# Patient Record
Sex: MALE | Race: WHITE | NOT HISPANIC OR LATINO | URBAN - METROPOLITAN AREA
[De-identification: names, ages, dates, MRNs, and addresses within clinical notes are randomized per-mention and may not be internally consistent; named-entity substitution may affect disease eponyms.]

---

## 2017-01-03 ENCOUNTER — COMMUNICATION - HEALTHEAST (OUTPATIENT)
Dept: CARDIOLOGY | Facility: CLINIC | Age: 72
End: 2017-01-03

## 2017-01-12 ENCOUNTER — AMBULATORY - HEALTHEAST (OUTPATIENT)
Dept: CARDIOLOGY | Facility: CLINIC | Age: 72
End: 2017-01-12

## 2017-01-12 DIAGNOSIS — I48.19 PERSISTENT ATRIAL FIBRILLATION (H): ICD-10-CM

## 2017-01-16 ENCOUNTER — COMMUNICATION - HEALTHEAST (OUTPATIENT)
Dept: CARDIOLOGY | Facility: CLINIC | Age: 72
End: 2017-01-16

## 2017-01-16 DIAGNOSIS — I48.20 CHRONIC ATRIAL FIBRILLATION (H): ICD-10-CM

## 2017-01-17 ENCOUNTER — AMBULATORY - HEALTHEAST (OUTPATIENT)
Dept: CARDIOLOGY | Facility: CLINIC | Age: 72
End: 2017-01-17

## 2017-01-17 ENCOUNTER — OFFICE VISIT - HEALTHEAST (OUTPATIENT)
Dept: CARDIOLOGY | Facility: CLINIC | Age: 72
End: 2017-01-17

## 2017-01-17 DIAGNOSIS — I48.19 PERSISTENT ATRIAL FIBRILLATION (H): ICD-10-CM

## 2017-01-17 DIAGNOSIS — R06.09 DYSPNEA ON EXERTION: ICD-10-CM

## 2017-01-17 DIAGNOSIS — I48.20 CHRONIC ATRIAL FIBRILLATION (H): ICD-10-CM

## 2017-01-17 DIAGNOSIS — I25.10 CORONARY ATHEROSCLEROSIS: ICD-10-CM

## 2017-01-17 ASSESSMENT — MIFFLIN-ST. JEOR: SCORE: 1883.85

## 2017-01-18 LAB
ATRIAL RATE - MUSE: 227 BPM
DIASTOLIC BLOOD PRESSURE - MUSE: NORMAL MMHG
INTERPRETATION ECG - MUSE: NORMAL
P AXIS - MUSE: NORMAL DEGREES
PR INTERVAL - MUSE: NORMAL MS
QRS DURATION - MUSE: 88 MS
QT - MUSE: 424 MS
QTC - MUSE: 426 MS
R AXIS - MUSE: -10 DEGREES
SYSTOLIC BLOOD PRESSURE - MUSE: NORMAL MMHG
T AXIS - MUSE: -6 DEGREES
VENTRICULAR RATE- MUSE: 61 BPM

## 2017-01-19 ENCOUNTER — HOSPITAL ENCOUNTER (OUTPATIENT)
Dept: CARDIOLOGY | Facility: HOSPITAL | Age: 72
Discharge: HOME | End: 2017-01-19
Attending: NURSE PRACTITIONER

## 2017-01-19 DIAGNOSIS — I48.20 CHRONIC ATRIAL FIBRILLATION (H): ICD-10-CM

## 2017-01-27 ENCOUNTER — AMBULATORY - HEALTHEAST (OUTPATIENT)
Dept: CARDIOLOGY | Facility: CLINIC | Age: 72
End: 2017-01-27

## 2017-03-17 ENCOUNTER — COMMUNICATION - HEALTHEAST (OUTPATIENT)
Dept: CARDIOLOGY | Facility: CLINIC | Age: 72
End: 2017-03-17

## 2017-03-17 DIAGNOSIS — I48.20 CHRONIC ATRIAL FIBRILLATION (H): ICD-10-CM

## 2017-03-29 ENCOUNTER — COMMUNICATION - HEALTHEAST (OUTPATIENT)
Dept: CARDIOLOGY | Facility: CLINIC | Age: 72
End: 2017-03-29

## 2017-03-29 DIAGNOSIS — I48.20 CHRONIC ATRIAL FIBRILLATION (H): ICD-10-CM

## 2017-04-04 ENCOUNTER — AMBULATORY - HEALTHEAST (OUTPATIENT)
Dept: CARDIOLOGY | Facility: CLINIC | Age: 72
End: 2017-04-04

## 2017-04-04 DIAGNOSIS — I48.20 CHRONIC ATRIAL FIBRILLATION (H): ICD-10-CM

## 2017-04-11 ENCOUNTER — AMBULATORY - HEALTHEAST (OUTPATIENT)
Dept: CARDIOLOGY | Facility: CLINIC | Age: 72
End: 2017-04-11

## 2017-04-11 DIAGNOSIS — I48.19 PERSISTENT ATRIAL FIBRILLATION (H): ICD-10-CM

## 2017-04-17 ENCOUNTER — AMBULATORY - HEALTHEAST (OUTPATIENT)
Dept: CARDIOLOGY | Facility: CLINIC | Age: 72
End: 2017-04-17

## 2017-04-17 DIAGNOSIS — I48.19 PERSISTENT ATRIAL FIBRILLATION (H): ICD-10-CM

## 2017-04-24 ENCOUNTER — COMMUNICATION - HEALTHEAST (OUTPATIENT)
Dept: LAB | Facility: CLINIC | Age: 72
End: 2017-04-24

## 2017-04-26 ENCOUNTER — AMBULATORY - HEALTHEAST (OUTPATIENT)
Dept: CARDIOLOGY | Facility: CLINIC | Age: 72
End: 2017-04-26

## 2017-04-26 DIAGNOSIS — I48.19 PERSISTENT ATRIAL FIBRILLATION (H): ICD-10-CM

## 2017-05-05 ENCOUNTER — COMMUNICATION - HEALTHEAST (OUTPATIENT)
Dept: CARDIOLOGY | Facility: CLINIC | Age: 72
End: 2017-05-05

## 2017-05-05 DIAGNOSIS — I48.20 CHRONIC ATRIAL FIBRILLATION (H): ICD-10-CM

## 2017-05-31 ENCOUNTER — COMMUNICATION - HEALTHEAST (OUTPATIENT)
Dept: CARDIOLOGY | Facility: CLINIC | Age: 72
End: 2017-05-31

## 2017-05-31 DIAGNOSIS — I48.20 CHRONIC ATRIAL FIBRILLATION (H): ICD-10-CM

## 2017-06-14 ENCOUNTER — COMMUNICATION - HEALTHEAST (OUTPATIENT)
Dept: CARDIOLOGY | Facility: CLINIC | Age: 72
End: 2017-06-14

## 2017-06-14 DIAGNOSIS — I48.20 CHRONIC ATRIAL FIBRILLATION (H): ICD-10-CM

## 2017-07-07 ENCOUNTER — COMMUNICATION - HEALTHEAST (OUTPATIENT)
Dept: CARDIOLOGY | Facility: CLINIC | Age: 72
End: 2017-07-07

## 2017-07-07 DIAGNOSIS — I48.20 CHRONIC ATRIAL FIBRILLATION (H): ICD-10-CM

## 2017-07-18 ENCOUNTER — AMBULATORY - HEALTHEAST (OUTPATIENT)
Dept: CARDIOLOGY | Facility: CLINIC | Age: 72
End: 2017-07-18

## 2017-07-18 DIAGNOSIS — I48.91 ATRIAL FIBRILLATION (H): ICD-10-CM

## 2017-07-18 DIAGNOSIS — I48.20 CHRONIC ATRIAL FIBRILLATION (H): ICD-10-CM

## 2017-07-27 ENCOUNTER — AMBULATORY - HEALTHEAST (OUTPATIENT)
Dept: CARDIOLOGY | Facility: CLINIC | Age: 72
End: 2017-07-27

## 2017-07-27 DIAGNOSIS — I48.19 PERSISTENT ATRIAL FIBRILLATION (H): ICD-10-CM

## 2017-08-29 ENCOUNTER — AMBULATORY - HEALTHEAST (OUTPATIENT)
Dept: CARDIOLOGY | Facility: CLINIC | Age: 72
End: 2017-08-29

## 2017-08-29 DIAGNOSIS — I48.19 PERSISTENT ATRIAL FIBRILLATION (H): ICD-10-CM

## 2018-03-15 ENCOUNTER — AMBULATORY - HEALTHEAST (OUTPATIENT)
Dept: CARDIOLOGY | Facility: CLINIC | Age: 73
End: 2018-03-15

## 2018-03-15 DIAGNOSIS — I48.20 CHRONIC ATRIAL FIBRILLATION (H): ICD-10-CM

## 2018-03-15 DIAGNOSIS — I48.19 PERSISTENT ATRIAL FIBRILLATION (H): ICD-10-CM

## 2018-03-15 LAB — INTERNATIONAL NORMALIZATION RATIO, POC - HISTORICAL: 2.7

## 2018-04-16 ENCOUNTER — COMMUNICATION - HEALTHEAST (OUTPATIENT)
Dept: CARDIOLOGY | Facility: CLINIC | Age: 73
End: 2018-04-16

## 2018-04-16 DIAGNOSIS — I48.20 CHRONIC ATRIAL FIBRILLATION (H): ICD-10-CM

## 2018-04-16 RX ORDER — WARFARIN SODIUM 5 MG/1
TABLET ORAL
Qty: 90 TABLET | Refills: 0 | Status: SHIPPED | OUTPATIENT
Start: 2018-04-16

## 2018-05-15 ENCOUNTER — AMBULATORY - HEALTHEAST (OUTPATIENT)
Dept: CARDIOLOGY | Facility: CLINIC | Age: 73
End: 2018-05-15

## 2018-05-15 DIAGNOSIS — I48.91 ATRIAL FIBRILLATION (H): ICD-10-CM

## 2018-05-15 LAB — INTERNATIONAL NORMALIZATION RATIO, POC - HISTORICAL: 2.2

## 2018-05-31 ENCOUNTER — OFFICE VISIT - HEALTHEAST (OUTPATIENT)
Dept: CARDIOLOGY | Facility: CLINIC | Age: 73
End: 2018-05-31

## 2018-05-31 ENCOUNTER — AMBULATORY - HEALTHEAST (OUTPATIENT)
Dept: CARDIOLOGY | Facility: CLINIC | Age: 73
End: 2018-05-31

## 2018-05-31 ENCOUNTER — RECORDS - HEALTHEAST (OUTPATIENT)
Dept: ADMINISTRATIVE | Facility: OTHER | Age: 73
End: 2018-05-31

## 2018-05-31 ENCOUNTER — COMMUNICATION - HEALTHEAST (OUTPATIENT)
Dept: CARDIOLOGY | Facility: CLINIC | Age: 73
End: 2018-05-31

## 2018-05-31 DIAGNOSIS — I25.10 CORONARY ATHEROSCLEROSIS: ICD-10-CM

## 2018-05-31 DIAGNOSIS — I48.91 ATRIAL FIBRILLATION (H): ICD-10-CM

## 2018-05-31 RX ORDER — AMOXICILLIN 500 MG/1
500 CAPSULE ORAL 2 TIMES DAILY
Refills: 0 | Status: SHIPPED | COMMUNITY
Start: 2018-03-16

## 2018-05-31 ASSESSMENT — MIFFLIN-ST. JEOR: SCORE: 1852.1

## 2018-06-13 ENCOUNTER — AMBULATORY - HEALTHEAST (OUTPATIENT)
Dept: LAB | Facility: HOSPITAL | Age: 73
End: 2018-06-13

## 2018-06-13 ENCOUNTER — HOSPITAL ENCOUNTER (OUTPATIENT)
Dept: CARDIOLOGY | Facility: HOSPITAL | Age: 73
Discharge: HOME | End: 2018-06-13
Attending: INTERNAL MEDICINE

## 2018-06-13 DIAGNOSIS — I25.10 CORONARY ATHEROSCLEROSIS: ICD-10-CM

## 2018-06-13 DIAGNOSIS — I48.91 ATRIAL FIBRILLATION (H): ICD-10-CM

## 2018-06-13 LAB
ALBUMIN SERPL-MCNC: 4.2 G/DL (ref 3.5–5)
ALP SERPL-CCNC: 106 U/L (ref 45–120)
ALT SERPL W P-5'-P-CCNC: 19 U/L (ref 0–45)
AORTIC ROOT: 3.3 CM
AST SERPL W P-5'-P-CCNC: 24 U/L (ref 0–40)
BILIRUB DIRECT SERPL-MCNC: 0.5 MG/DL
BILIRUB SERPL-MCNC: 2.1 MG/DL (ref 0–1)
BSA FOR ECHO PROCEDURE: 2.34 M2
CHOLEST SERPL-MCNC: 151 MG/DL
CV BLOOD PRESSURE: NORMAL MMHG
CV ECHO HEIGHT: 74 IN
CV ECHO WEIGHT: 232 LBS
DOP CALC MV VTI: 26.2 CM
DOP CALCLVOT PEAK VEL VTI: 17.3 CM
EJECTION FRACTION: 63 % (ref 55–75)
FASTING STATUS PATIENT QL REPORTED: YES
FRACTIONAL SHORTENING: 38 % (ref 28–44)
HDLC SERPL-MCNC: 30 MG/DL
INTERVENTRICULAR SEPTUM IN END DIASTOLE: 0.9 CM (ref 0.6–1)
IVS/PW RATIO: 1
LA AREA 1: 33.6 CM2
LA AREA 2: 33.8 CM2
LDLC SERPL CALC-MCNC: 73 MG/DL
LEFT ATRIUM LENGTH: 7.23 CM
LEFT ATRIUM SIZE: 4.9 CM
LEFT ATRIUM VOLUME INDEX: 57.1 ML/M2
LEFT ATRIUM VOLUME: 133.5 ML
LEFT VENTRICLE DIASTOLIC VOLUME INDEX: 58.1 CM3/M2 (ref 34–74)
LEFT VENTRICLE DIASTOLIC VOLUME: 136 CM3 (ref 62–150)
LEFT VENTRICLE HEART RATE: 53 BPM
LEFT VENTRICLE MASS INDEX: 67.6 G/M2
LEFT VENTRICLE SYSTOLIC VOLUME INDEX: 21.4 CM3/M2 (ref 11–31)
LEFT VENTRICLE SYSTOLIC VOLUME: 50 CM3 (ref 21–61)
LEFT VENTRICULAR INTERNAL DIMENSION IN DIASTOLE: 5 CM (ref 4.2–5.8)
LEFT VENTRICULAR INTERNAL DIMENSION IN SYSTOLE: 3.1 CM (ref 2.5–4)
LEFT VENTRICULAR MASS: 158.2 G
LEFT VENTRICULAR OUTFLOW TRACT MEAN GRADIENT: 1 MMHG
LEFT VENTRICULAR OUTFLOW TRACT MEAN VELOCITY: 53.4 CM/S
LEFT VENTRICULAR POSTERIOR WALL IN END DIASTOLE: 0.9 CM (ref 0.6–1)
MITRAL VALVE DECELERATION SLOPE: 3620 MM/S2
MITRAL VALVE MEAN INFLOW VELOCITY: 56.2 CM/S
MITRAL VALVE PEAK VELOCITY: 95 CM/S
MITRAL VALVE PRESSURE HALF-TIME: 78 MS
MV AVERAGE E/E' RATIO: 8.1 CM/S
MV DECELERATION TIME: 285 MS
MV E'TISSUE VEL-LAT: 11.1 CM/S
MV E'TISSUE VEL-MED: 6.53 CM/S
MV LATERAL E/E' RATIO: 6.4
MV MEAN GRADIENT: 1 MMHG
MV MEDIAL E/E' RATIO: 10.9
MV PEAK E VELOCITY: 71.3 CM/S
MV PEAK GRADIENT: 3.6 MMHG
MV VALVE AREA PRESSURE 1/2 METHOD: 2.8 CM2
NUC REST DIASTOLIC VOLUME INDEX: 3712 LBS
NUC REST SYSTOLIC VOLUME INDEX: 74 IN
PROT SERPL-MCNC: 7.4 G/DL (ref 6–8)
TRICUSPID REGURGITATION PEAK PRESSURE GRADIENT: 38.2 MMHG
TRICUSPID VALVE ANULAR PLANE SYSTOLIC EXCURSION: 1.4 CM
TRICUSPID VALVE PEAK REGURGITANT VELOCITY: 309 CM/S
TRIGL SERPL-MCNC: 240 MG/DL

## 2018-06-13 ASSESSMENT — MIFFLIN-ST. JEOR: SCORE: 1852.1

## 2018-06-15 ENCOUNTER — COMMUNICATION - HEALTHEAST (OUTPATIENT)
Dept: CARDIOLOGY | Facility: CLINIC | Age: 73
End: 2018-06-15

## 2018-06-19 ENCOUNTER — COMMUNICATION - HEALTHEAST (OUTPATIENT)
Dept: CARDIOLOGY | Facility: CLINIC | Age: 73
End: 2018-06-19

## 2018-09-13 ENCOUNTER — COMMUNICATION - HEALTHEAST (OUTPATIENT)
Dept: CARDIOLOGY | Facility: CLINIC | Age: 73
End: 2018-09-13

## 2018-09-13 ENCOUNTER — AMBULATORY - HEALTHEAST (OUTPATIENT)
Dept: CARDIOLOGY | Facility: CLINIC | Age: 73
End: 2018-09-13

## 2018-11-20 ENCOUNTER — COMMUNICATION - HEALTHEAST (OUTPATIENT)
Dept: CARDIOLOGY | Facility: CLINIC | Age: 73
End: 2018-11-20

## 2018-11-20 DIAGNOSIS — I48.20 CHRONIC ATRIAL FIBRILLATION (H): ICD-10-CM

## 2019-04-26 ENCOUNTER — COMMUNICATION - HEALTHEAST (OUTPATIENT)
Dept: ANTICOAGULATION | Facility: CLINIC | Age: 74
End: 2019-04-26

## 2019-04-26 DIAGNOSIS — I48.91 ATRIAL FIBRILLATION (H): ICD-10-CM

## 2019-09-25 ENCOUNTER — RECORDS - HEALTHEAST (OUTPATIENT)
Dept: LAB | Facility: CLINIC | Age: 74
End: 2019-09-25

## 2019-09-25 LAB
ANION GAP SERPL CALCULATED.3IONS-SCNC: 5 MMOL/L (ref 5–18)
BUN SERPL-MCNC: 29 MG/DL (ref 8–28)
CALCIUM SERPL-MCNC: 9 MG/DL (ref 8.5–10.5)
CHLORIDE BLD-SCNC: 99 MMOL/L (ref 98–107)
CO2 SERPL-SCNC: 34 MMOL/L (ref 22–31)
CREAT SERPL-MCNC: 1.07 MG/DL (ref 0.7–1.3)
GFR SERPL CREATININE-BSD FRML MDRD: >60 ML/MIN/1.73M2
GLUCOSE BLD-MCNC: 129 MG/DL (ref 70–125)
POTASSIUM BLD-SCNC: 3.7 MMOL/L (ref 3.5–5)
SODIUM SERPL-SCNC: 138 MMOL/L (ref 136–145)

## 2019-12-26 ENCOUNTER — RECORDS - HEALTHEAST (OUTPATIENT)
Dept: LAB | Facility: CLINIC | Age: 74
End: 2019-12-26

## 2019-12-26 LAB
ALBUMIN UR-MCNC: ABNORMAL MG/DL
APPEARANCE UR: ABNORMAL
BACTERIA #/AREA URNS HPF: ABNORMAL HPF
BILIRUB UR QL STRIP: NEGATIVE
COLOR UR AUTO: ABNORMAL
GLUCOSE UR STRIP-MCNC: NEGATIVE MG/DL
HGB UR QL STRIP: ABNORMAL
INR PPP: 4.01 (ref 0.9–1.1)
KETONES UR STRIP-MCNC: NEGATIVE MG/DL
LEUKOCYTE ESTERASE UR QL STRIP: ABNORMAL
NITRATE UR QL: POSITIVE
PH UR STRIP: 6 [PH] (ref 4.5–8)
RBC #/AREA URNS AUTO: ABNORMAL HPF
SP GR UR STRIP: 1.02 (ref 1–1.03)
SQUAMOUS #/AREA URNS AUTO: ABNORMAL LPF
UROBILINOGEN UR STRIP-ACNC: ABNORMAL
WBC #/AREA URNS AUTO: >100 HPF
WBC CLUMPS #/AREA URNS HPF: PRESENT /[HPF]

## 2019-12-27 ENCOUNTER — RECORDS - HEALTHEAST (OUTPATIENT)
Dept: LAB | Facility: CLINIC | Age: 74
End: 2019-12-27

## 2019-12-27 LAB — INR PPP: 5.52 (ref 0.9–1.1)

## 2019-12-28 LAB — BACTERIA SPEC CULT: ABNORMAL

## 2021-05-30 VITALS — BODY MASS INDEX: 30.67 KG/M2 | HEIGHT: 74 IN | WEIGHT: 239 LBS

## 2021-06-01 VITALS — HEIGHT: 74 IN | BODY MASS INDEX: 29.77 KG/M2 | WEIGHT: 232 LBS

## 2021-06-01 VITALS — HEIGHT: 74 IN | WEIGHT: 232 LBS | BODY MASS INDEX: 29.77 KG/M2

## 2021-06-08 NOTE — PROGRESS NOTES
Pt INR low at 1.6 after holding dose. Will adjust to 10 mg M,F and 7.5 mg all other days. Retest in 2 weeks and eat mod greens. After talking with pt and discussing history of greens/salads and medication change. Pt will  continue  with current diet and dosing of Warfarin.  Continue with moderation of Vit K and green leafy vegetables. Cautioned to call with increase bruising or bleeding. Reminded to call with medication change especially antibiotic. Call with any questions or concerns or any up coming procedures. Cautioned about using Herbal medication.

## 2021-06-08 NOTE — PROGRESS NOTES
Holter monitor showed persistent atrial fibrillation with a mild tendency towards slower ventricular response that could possibly be contributing to his dyspnea on exertion.  As his blood pressures are also on the lower side, recommended he decrease his atenolol to 12.5 mg daily to see if his symptoms improve.  Discussed with Adi who is in agreement.  Follow-up with Dr. Vega on 3/1/17 as scheduled.

## 2021-06-08 NOTE — PROGRESS NOTES
"[facility]    Assessment/Plan:      Problem List Items Addressed This Visit     Coronary Artery Disease    Persistent Atrial Fibrillation - Primary    Relevant Orders    ECG 12 lead with MUSE (Completed)    Holter Monitor    Dyspnea on exertion        1.  Chronic atrial fibrillation: Asymptomatic and ventricular response appears well controlled.  He reports no increased or decreased heart rates.  He will wear a 24-hour Holter monitor for further evaluation of ventricular rates, but this is unlikely to be contributing to his dyspnea on exertion.  He is on long-term anticoagulation with warfarin for stroke prophylaxis.    2.  Coronary artery disease: No symptoms indicative of myocardial ischemia and no EKG evidence for ischemia.    3.  Dyspnea on exertion: Most likely pulmonary in origin.  His pulmonologist started him on a course of antibiotics and prednisone yesterday.    Follow-up with Dr. Vega on 3/1/17 as previously scheduled.    Subjective:      Adi Keyes is a very pleasant 71-year-old gentleman who comes in today for urgent EP evaluation of worsening dyspnea on exertion.  He has a history of coronary artery disease with previous stents, chronic atrial fibrillation that is asymptomatic and rate controlled, newly diagnosed lung disease, hyperlipidemia, and factor V Leiden deficiency.  Over David, he felt that he was \"fighting a bug\" and felt some chest heaviness; this has resolved.  However, his dyspnea on exertion has worsened and he has developed a productive cough.  He denies exertional  chest discomfort, palpitations, shortness of breath at rest or with light activity , paroxysmal nocturnal dyspnea, orthopnea, lightheadedness, dizziness, pre-syncope, or syncope. he saw his pulmonologist yesterday who started him on a course of antibiotics and prednisone.      Medical, surgical, family, social history, and medications were reviewed and updated as necessary.    Patient Active Problem List "   Diagnosis     Hypothyroidism     Hyperlipidemia     Coronary Artery Disease     Persistent Atrial Fibrillation     Atypical Chest Pain     Factor 5 Leiden mutation, heterozygous     Dyspnea on exertion       Past Medical History   Diagnosis Date     Atrial fibrillation      Cancer      bladder cancer and testicular      Coronary artery disease      Disease of thyroid gland      Hyperlipidemia      Hypertension        Past Surgical History   Procedure Laterality Date     Pr insert intracoronary stent       Description: Cath Stent Placement;  Proc Date: 03/28/2006;  Comments: Mid LAD     Appendectomy       Surgery scrotal / testicular       Rotator cuff repair       Tonsillectomy       Cholecystectomy         Allergies   Allergen Reactions     Pentazocine Lactate Nausea And Vomiting     headache       Current Outpatient Prescriptions   Medication Sig Dispense Refill     atenolol (TENORMIN) 25 MG tablet Take 25 mg by mouth daily.       atorvastatin (LIPITOR) 40 MG tablet Take 1 tablet (40 mg total) by mouth bedtime. 90 tablet 5     cephalexin (KEFLEX) 250 MG capsule Take 500 mg by mouth daily.       levothyroxine (SYNTHROID, LEVOTHROID) 200 MCG tablet Take 200 mcg by mouth daily.       nitroglycerin (NITROSTAT) 0.4 MG SL tablet Place 0.4 mg under the tongue every 5 (five) minutes as needed for chest pain.       sertraline (ZOLOFT) 100 MG tablet Take 100 mg by mouth daily.       warfarin (COUMADIN) 5 MG tablet 7.5 mg sun, tue, thur and 10 mg all other days. 180 tablet 0     aspirin 81 MG EC tablet Take 81 mg by mouth daily.       No current facility-administered medications for this visit.        Family History   Problem Relation Age of Onset     Heart disease Father      Diabetes Father        Social History   Substance Use Topics     Smoking status: Never Smoker     Smokeless tobacco: None     Alcohol use No       Review of Systems:   General: WNL  Eyes: WNL  Ears/Nose/Throat: WNL  Lungs: Cough, Snoring  Heart:  "Shortness of Breath with activity, Irregular Heartbeat  Stomach: WNL  Bladder: WNL  Muscle/Joints: WNL  Skin: WNL  Nervous System: WNL  Mental Health: WNL     Blood: WNL      Objective:      Visit Vitals     /58 (Patient Site: Left Arm, Patient Position: Sitting, Cuff Size: Adult Large)     Pulse 64  Comment: Irregular     Ht 6' 2\" (1.88 m)     Wt (!) 239 lb (108.4 kg)     BMI 30.69 kg/m2       Physical Exam  General Appearance:   Alert, well-appearing, in no acute distress.     HEENT:  Atraumatic, normocephalic; no scleral icterus, EOM intact; the mucous membranes were pink and moist.   Chest: Chest symmetric, spine straight.     Lungs:   Respirations unlabored; the lungs are clear to auscultation.   Cardiovascular:   Auscultation reveals normal first and second heart sounds with no murmurs, rubs, or gallops.  Irregularly irregular rate and rhythm. No JVD or HJR.  Radial and posterior tibial pulses are intact.    Abdomen:  Soft, nontender, nondistended, bowel sounds present.     Extremities: No cyanosis, clubbing, or edema.   Musculoskeletal:   moves all extremities.     Skin: No xanthelasma.   Neurologic: Mood and affect are appropriate. Oriented to person, place, time, and situation.       Cardiographics (personally reviewed)  EC17 was personally reviewed, shows atrial fibrillation with controlled ventricular response at 61 bpm, no indication for ischemia.    24-hour Holter monitor worn 16 shows persistent atrial fibrillation with controlled ventricular response.    Stress Echocardiogram: Done 10/21/14  1. Normal stress echocardiogram without evidence stress induced ischemia.  2. There are no resting or stress induced regional wall motion  abnormalities.  3. Normal resting LV systolic performance with an ejection fraction of  55-60%. There is normal improvement in LV function post exercise with a  peak ejection fraction of approximately 70-75%.  4. No ECG evidence of ischemia.  5. No anginal " chest pain reported with exercise.    Coronary angiogram done 8/10/15  70 yo male wiht hx fo PCI to mid LAD and dyspnea on exertion  wiht normal stress echo and abn CTA. Angiography normal LM,  mild dz in LAD, with mild to mod insetent stenosis of LAD  stent, mild dz of Circ and RCA. Normal LVEDP and EF 70% no  WMA, noted mild increase in LVEDP post injection raises  concern for diastolic dysfunction        Greater than 35 minutes were spent face to face in this visit with more than 50% of the time discussing diagnoses as listed above, counseling, and coordination of care.      Kitty Michel, [facility], Electrophysiology  [phone]    This note has been dictated using voice recognition software. Any grammatical, typographical, or context distortions are unintentional and inherent to the software.

## 2021-06-08 NOTE — PROGRESS NOTES
INR high at 4.7. Will hold Warfarin today and decrease Friday dose to 7.5 mg from 10 mg. Increase Greens and salads for Vit K. After talking with pt and discussing history of greens/salads and medication change. Pt will  continue  with current diet and dosing of Warfarin.  Continue with moderation of Vit K and green leafy vegetables. Cautioned to call with increase bruising or bleeding. Reminded to call with medication change especially antibiotic. Call with any questions or concerns or any up coming procedures. Cautioned about using Herbal medication.

## 2021-06-09 ENCOUNTER — RECORDS - HEALTHEAST (OUTPATIENT)
Dept: ADMINISTRATIVE | Facility: CLINIC | Age: 76
End: 2021-06-09

## 2021-06-09 NOTE — PROGRESS NOTES
INR 3.7. Will decrease dose to 7.5 daily and retest in one week. Pt is also starting an antibiotic. After talking with pt and discussing history of greens/salads and medication change. Pt will  continue  with current diet and dosing of Warfarin.  Continue with moderation of Vit K and green leafy vegetables. Cautioned to call with increase bruising or bleeding. Reminded to call with medication change especially antibiotic. Call with any questions or concerns or any up coming procedures. Cautioned about using Herbal medication.

## 2021-06-10 NOTE — PROGRESS NOTES
INR 4.0 will hold today's dose of Warfarin and then decrease dose to 5 mg M,W,F and 7.5 mg all other days. Pt is on antibiotic and steroids for a couple of weeks. After talking with pt and discussing history of greens/salads and medication change. Pt will  continue  with current diet and dosing of Warfarin.  Continue with moderation of Vit K and green leafy vegetables. Cautioned to call with increase bruising or bleeding. Reminded to call with medication change especially antibiotic. Call with any questions or concerns or any up coming procedures. Cautioned about using Herbal medication.

## 2021-06-10 NOTE — PROGRESS NOTES
Pt will increase dose of warfarin as listed above, pt will recheck INR as indicated above, pt denies s/s of bleeding, pt has clear understanding of warfarin management and does not need further education at this time, further education provided to pt regarding vitamin K diet and how changes in diet or medication can impact INR results/warfarin management, pt understands to contact the clinic with s/s of bleeding and pt was given these instructions via CMA   Pt recently held, and reduced dose of warfarin d/t supratheraputic INR

## 2021-06-11 NOTE — PROGRESS NOTES
INR 1.9. Pt has been seeing MD at [facility] and having INRs done there. Will increase dose from [facility] last dosing of 7.5 mg 3/7 and 10 4/7 days. Increase to 7.5 mg M and F and 10 mg all other days. After talking with pt and discussing history of greens/salads and medication change. Pt will  continue  with current diet and dosing of Warfarin.  Continue with moderation of Vit K and green leafy vegetables. Cautioned to call with increase bruising or bleeding. Reminded to call with medication change especially antibiotic. Call with any questions or concerns or any up coming procedures. Cautioned about using Herbal medication.

## 2021-06-12 NOTE — PROGRESS NOTES
INR stable. Discussed continuing management of dose of Warfarin and returning in one month . No changes to diet needed at this time. Continue moderate intake of Vitamin K and call if increase bruising or unexplained bleeding. Call with medication changes or upcoming procedures.  INR 3.2 will increase green tea and increase salad. Going [place] a motorcycle ride to [place] and back.

## 2021-06-15 PROBLEM — R06.09 DYSPNEA ON EXERTION: Status: ACTIVE | Noted: 2017-01-17

## 2021-06-16 NOTE — PROGRESS NOTES
INR 2. INR stable. Discussed continuing management of dose of Warfarin and returning in one month . No changes to diet needed at this time. Continue moderate intake of Vitamin K and call if increase bruising or unexplained bleeding. Call with medication changes or upcoming procedures.

## 2021-06-18 NOTE — PROGRESS NOTES
[facility] Progress Note    Assessment:  1.  Coronary artery disease.  Angiographic evaluation from 2015 demonstrated a prior placed stents were patent and no significant progression of coronary artery disease identified.  We discussed the importance of ongoing risk factor modification.    2.  Chronic atrial fibrillation.  We again discussed today the potential for changing from warfarin to 1 of the newer anticoagulant agents.  He previously decided that his preference was for warfarin because of the cost.  He is going to reexamine this with his insurance carrier.  He also tells me that he is interested in consideration of the watchman device.  I gave him a pamphlet and forward his name to the watchman group.    3.  Chronic cough and hypersensitivity pneumonitis.  He has been followed at the [facility].  I am going to plan echocardiography to reevaluate left ventricular right ventricular function and right-sided pressures.    4.  Hyperlipidemia.  I do not locate recent lipid studies unless he had these done at the [facility].  I have not seen him since 2016 at which time his LDL was 98 and triglycerides were 299.  This is above ideal.  At the time he was going to make some dietary changes rather than increase his atorvastatin.  Going to recommend follow-up lipid and liver panel.        Plan: As outlined above with follow-up in 6 months.    1. Atrial fibrillation  Echo Complete   2. Coronary atherosclerosis           An After Visit Summary was printed and given to the patient.    Subjective:    Adi Keyes is a 72 y.o. male who returned for a planned  follow up visit.  He reports that he has had no specific complaints of chest pain and progressive shortness of breath.  He was evaluated at the [facility] because of persistent cough and I have some of the records although have requested additional records he was diagnosed with Ashleigh pneumonitis and is been on CellCept and antibiotics.  He  reports that he continues to have some shortness of breath when he climbs stairs but this has not been progressive.  He is not describing orthopnea or PND.  He states the cough is mildly improved.  He tells me he has follow-up at the [facility] within the next 1-2 months.    He has a history of coronary artery disease with atypical chest discomfort which prompted coronary angiography in 2015 that demonstrated patent stents and no significant progression of his coronary disease.    Review of Systems:   General: WNL  Eyes: WNL  Ears/Nose/Throat: WNL  Lungs: WNL  Heart: WNL  Stomach: WNL  Bladder: WNL  Muscle/Joints: WNL  Skin: WNL  Nervous System: WNL  Mental Health: WNL     Blood: WNL      Problem List:    Patient Active Problem List   Diagnosis     Hypothyroidism     Hyperlipidemia     Coronary Artery Disease     Persistent Atrial Fibrillation     Atypical Chest Pain     Factor 5 Leiden mutation, heterozygous     Dyspnea on exertion       Social History     Social History     Marital status:      Spouse name: N/A     Number of children: N/A     Years of education: N/A     Occupational History     Not on file.     Social History Main Topics     Smoking status: Never Smoker     Smokeless tobacco: Not on file     Alcohol use No     Drug use: Not on file     Sexual activity: Not on file     Other Topics Concern     Not on file     Social History Narrative       Family History   Problem Relation Age of Onset     Heart disease Father      Diabetes Father        Current Outpatient Prescriptions   Medication Sig Dispense Refill     atenolol (TENORMIN) 25 MG tablet Take 12.5 mg by mouth daily.       atorvastatin (LIPITOR) 40 MG tablet Take 1 tablet (40 mg total) by mouth bedtime. 90 tablet 5     cephalexin (KEFLEX) 250 MG capsule Take 500 mg by mouth daily.       levothyroxine (SYNTHROID, LEVOTHROID) 200 MCG tablet Take 200 mcg by mouth daily.       nitroglycerin (NITROSTAT) 0.4 MG SL tablet Place 0.4 mg under the  "tongue every 5 (five) minutes as needed for chest pain.       sertraline (ZOLOFT) 100 MG tablet Take 100 mg by mouth daily.       warfarin (COUMADIN) 5 MG tablet Take 7.5 mg M and F and 10 mg all other days. 90 tablet 0     amoxicillin (AMOXIL) 500 MG capsule Take 500 mg by mouth 2 (two) times a day.  0     aspirin 81 MG EC tablet Take 81 mg by mouth daily.       No current facility-administered medications for this visit.        Objective:     /80 (Patient Site: Left Arm, Patient Position: Sitting, Cuff Size: Adult Regular)  Pulse 66  Resp 12  Ht 6' 2\" (1.88 m)  Wt (!) 232 lb (105.2 kg)  BMI 29.79 kg/m2  (!) 232 lb (105.2 kg)       Physical Exam:    GENERAL APPEARANCE: alert, no apparent distress  HEENT: no scleral icterus or xanthelasma  NECK: jugular venous pressure within normal limits  CHEST: symmetric, the lungs are clear to auscultation  CARDIOVASCULAR: Irregular rhythm, no significant murmur; no carotid bruits  Abdomen: No Organomegaly, masses, bruits, or tenderness. Bowels sounds are present      EXTREMITIES: no cyanosis, clubbing or edema    Cardiac Testing:  Electronically signed by GEORGES NEFF MD   (Diagnostic Physician) on 08/10/2015 at 10:12 AM      Angiographic Findings       Diagnostic Findings       Cardiac Arteries and Lesion Findings      LMCA: Normal.     LAD: Minimal disease.     LCx: Minimal disease.     RCA: Minimal disease.     Procedure Data  Procedure Date  Date: 08/10/2015Start: 09:36 AMEnd: 10:06 AM    Lab Results:    Lab Results   Component Value Date     08/10/2015    K 4.4 08/10/2015     (H) 08/10/2015    CO2 20 (L) 08/10/2015    BUN 17 08/10/2015    CREATININE 1.05 08/10/2015    CALCIUM 9.2 08/10/2015     Lab Results   Component Value Date    CHOL 179 11/06/2015    TRIG 294 (H) 11/06/2015    HDL 29 (L) 11/06/2015     No results found for: BNP  Creatinine (mg/dL)   Date Value   08/10/2015 1.05   06/04/2015 0.98     LDL Calculated (mg/dL)   Date Value "   11/06/2015 91   06/04/2015 77   11/12/2012 97     Lab Results   Component Value Date    WBC 7.1 08/10/2015    HGB 16.4 08/10/2015    HCT 46.9 08/10/2015    MCV 92 08/10/2015     08/10/2015               This note has been dictated using voice recognition software. Any grammatical or context distortions are unintentional and inherent to the software.      Lester Vega M.D., F.A.C.C.  [facility] Heart [facility]  [phone]

## 2021-06-20 NOTE — PROGRESS NOTES
[employer] brochure sent to pt for review and instructions to reach out to me should he be interested in possible [facility] consult

## 2021-06-26 ENCOUNTER — HEALTH MAINTENANCE LETTER (OUTPATIENT)
Age: 76
End: 2021-06-26

## 2021-10-16 ENCOUNTER — HEALTH MAINTENANCE LETTER (OUTPATIENT)
Age: 76
End: 2021-10-16

## 2022-07-23 ENCOUNTER — HEALTH MAINTENANCE LETTER (OUTPATIENT)
Age: 77
End: 2022-07-23

## 2022-10-01 ENCOUNTER — HEALTH MAINTENANCE LETTER (OUTPATIENT)
Age: 77
End: 2022-10-01

## 2023-08-06 ENCOUNTER — HEALTH MAINTENANCE LETTER (OUTPATIENT)
Age: 78
End: 2023-08-06